# Patient Record
(demographics unavailable — no encounter records)

---

## 2025-01-30 NOTE — HISTORY OF PRESENT ILLNESS
[FreeTextEntry1] : Presents in the [place] office for b/l feet pain. Complains of burning, tingling and cramping. States his skin is very sensitive. He tried to file down his nails with a machine  Retired nurse.

## 2025-01-30 NOTE — HISTORY OF PRESENT ILLNESS
[FreeTextEntry1] : Presents in the [place] office for b/l feet pain. Complains of burning, tingling and cramping. States his skin is very sensitive. He tried to file down his nails with a machine  Retired nurse.

## 2025-01-30 NOTE — ASSESSMENT
[FreeTextEntry1] : Discussed diagnosis and treatment with patient  Discussed etiology of symptoms patient is experiencing  Aseptic debridement of nails 1-5 bilateral reducing the length with a sterile nail clipper manually and reduced girth by power israel  Aseptic debridement of hyperkeratotic lesions greater than 4 lesions B/L feet down to and including epidermal layer with sterile 10-blade.  Discussed proper shoe gear with patient  Discussed the importance of alternating shoe gear every other day  Discussed the importance of appropriate moisture balance  Patient to return to the office in 3 months

## 2025-01-30 NOTE — PHYSICAL EXAM
[General Appearance - Alert] : alert [General Appearance - In No Acute Distress] : in no acute distress [1+] : left foot dorsalis pedis 1+ [No Joint Swelling] : no joint swelling [Deep Tendon Reflexes (DTR)] : deep tendon reflexes were 2+ and symmetric [Oriented To Time, Place, And Person] : oriented to person, place, and time [Impaired Insight] : insight and judgment were intact [Affect] : the affect was normal [Delayed in the Right Toes] : capillary refills normal in right toes [Delayed in the Left Toes] : capillary refills normal in the left toes [FreeTextEntry3] : diminished pedal hair, no digital ischemia [FreeTextEntry1] : numerous hyperkeratotic lesions noted to left plantar medial heel, B/L hallux medial, B/L 5th toes, right submet no underlying opening. no open lesions or drainage Nails 1-5 left right painful, thickened, discolored, dystrophic with subungual debris

## 2025-05-14 NOTE — REASON FOR VISIT
[Follow-Up Visit] : a follow-up visit for [Onychomycosis] : onychomycosis [FreeTextEntry2] : and calluses

## 2025-05-14 NOTE — HISTORY OF PRESENT ILLNESS
[FreeTextEntry1] : Presents in the [place] office for b/l feet pain. Complains of burning, tingling and cramping. States his skin is very sensitive. 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 Patient states he has corns that needs to be shaved. Patient states he needed to shave down the callus this morning due to the pain and pressure. Patient wants to discuss surgery for bunion.

## 2025-05-14 NOTE — PHYSICAL EXAM
[General Appearance - Alert] : alert [General Appearance - In No Acute Distress] : in no acute distress [1+] : left foot dorsalis pedis 1+ [No Joint Swelling] : no joint swelling [Deep Tendon Reflexes (DTR)] : deep tendon reflexes were 2+ and symmetric [Oriented To Time, Place, And Person] : oriented to person, place, and time [Impaired Insight] : insight and judgment were intact [Affect] : the affect was normal [Delayed in the Right Toes] : capillary refills normal in right toes [Delayed in the Left Toes] : capillary refills normal in the left toes [FreeTextEntry3] : diminished pedal hair, no digital ischemia [de-identified] : Valgus and pronated position of hallux Right Left  Dorsomedial bony prominence with associated tolerable pain on palpation to 1st metatarsal head Right Left  Reduced ROM 1st MTPJ with pain at end ROM and tracking joint motion Right Left.  Muscle strength 5/5 all major muscle groups bilateral.  [FreeTextEntry1] : numerous hyperkeratotic lesions noted to left plantar medial heel, B/L hallux medial, B/L 2nd toes medial, B/L 5th toes, right submet no underlying opening. no open lesions or drainage Nails 1-5 left right painful, thickened, discolored, dystrophic with subungual debris

## 2025-05-14 NOTE — ASSESSMENT
[FreeTextEntry1] : Discussed diagnosis and treatment with patient  Discussed etiology of symptoms patient is experiencing  Aseptic debridement of nails 1-5 bilateral reducing the length with a sterile nail clipper manually and reduced girth by power israel  Aseptic debridement of hyperkeratotic lesions greater than 4 lesions B/L feet down to and including epidermal layer with sterile 15-blade.  Discussed proper shoe gear with patient  Discussed the importance of alternating shoe gear every other day  Discussed the importance of appropriate moisture balance  Patient will discuss with his PCP regarding elective surgery clearance Patient to return to the office in 10 weeks

## 2025-06-23 NOTE — PHYSICAL EXAM
[General Appearance - Alert] : alert [General Appearance - In No Acute Distress] : in no acute distress [1+] : left foot dorsalis pedis 1+ [No Joint Swelling] : no joint swelling [Deep Tendon Reflexes (DTR)] : deep tendon reflexes were 2+ and symmetric [Oriented To Time, Place, And Person] : oriented to person, place, and time [Impaired Insight] : insight and judgment were intact [Affect] : the affect was normal

## 2025-07-23 NOTE — REASON FOR VISIT
[Follow-Up Visit] : a follow-up visit for [Foot Pain] : foot pain [Other:___] : [unfilled] [FreeTextEntry2] : bunion right > left

## 2025-07-23 NOTE — ASSESSMENT
[FreeTextEntry1] : Discussed diagnosis and treatment with patient  Discussed etiology of symptoms patient is experiencing  Aseptic debridement of nails 1-5 bilateral reducing the length with a sterile nail clipper manually and reduced girth by power israel  Aseptic debridement of hyperkeratotic lesions greater than 4 lesions B/L feet down to and including epidermal layer with sterile 15-blade.  Discussed proper shoe gear with patient  Discussed the importance of alternating shoe gear every other day  Discussed the importance of appropriate moisture balance  Patient will discuss with his PCP regarding elective surgery clearance Reviewed B/L foot xrays 3 views WB 6/23/25: moderate 1st IM angle of approx 14.5 deg and HHA approx 30 deg. decreased joint space 1st MTPJ. Hypertrophy of medial eminence 1st metatarsal head. Vessel calcification noted.  Rx AZAEL/PVR prior to elective surgery Patient to return to the office in 10 weeks for routine, or PRN to discuss surgery

## 2025-07-23 NOTE — PHYSICAL EXAM
[General Appearance - Alert] : alert [General Appearance - In No Acute Distress] : in no acute distress [1+] : left foot posterior tibialis 1+ [No Joint Swelling] : no joint swelling [Deep Tendon Reflexes (DTR)] : deep tendon reflexes were 2+ and symmetric [Oriented To Time, Place, And Person] : oriented to person, place, and time [Impaired Insight] : insight and judgment were intact [Affect] : the affect was normal [Delayed in the Right Toes] : capillary refills normal in right toes [Delayed in the Left Toes] : capillary refills normal in the left toes [2+] : left foot dorsalis pedis 2+ [FreeTextEntry3] : diminished pedal hair, no digital ischemia [de-identified] : Valgus and pronated position of hallux Right Left  Dorsomedial bony prominence with associated tolerable pain on palpation to 1st metatarsal head Right Left  Reduced ROM 1st MTPJ with pain at end ROM and tracking joint motion Right Left.  Muscle strength 5/5 all major muscle groups bilateral.  [FreeTextEntry1] : numerous hyperkeratotic lesions noted to left plantar medial heel, B/L hallux medial, B/L 2nd toes medial, B/L 5th toes, right submet Nails 1-5 left right painful, thickened, discolored, dystrophic with subungual debris no underlying opening. no open lesions or drainage

## 2025-07-23 NOTE — HISTORY OF PRESENT ILLNESS
[FreeTextEntry1] : Presents in the [place] office for foot care, has difficulty cutting his own nails.  Patient would like to further discuss surgery, he is willing to have surgery on right foot first.   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 states he was seen by PCP and was told "everything is good"  interested in surgical correction of painful bunion right foot more so than left.